# Patient Record
Sex: FEMALE | Race: WHITE | NOT HISPANIC OR LATINO | Employment: FULL TIME | URBAN - METROPOLITAN AREA
[De-identification: names, ages, dates, MRNs, and addresses within clinical notes are randomized per-mention and may not be internally consistent; named-entity substitution may affect disease eponyms.]

---

## 2017-10-27 ENCOUNTER — TRANSCRIBE ORDERS (OUTPATIENT)
Dept: ADMINISTRATIVE | Facility: HOSPITAL | Age: 52
End: 2017-10-27

## 2017-10-27 DIAGNOSIS — Z12.31 VISIT FOR SCREENING MAMMOGRAM: Primary | ICD-10-CM

## 2017-11-14 ENCOUNTER — HOSPITAL ENCOUNTER (OUTPATIENT)
Dept: RADIOLOGY | Age: 52
Discharge: HOME | End: 2017-11-14
Payer: COMMERCIAL

## 2017-11-14 DIAGNOSIS — Z12.31 VISIT FOR SCREENING MAMMOGRAM: ICD-10-CM

## 2017-11-14 PROCEDURE — G0202 SCR MAMMO BI INCL CAD: HCPCS

## 2017-11-16 ENCOUNTER — TRANSCRIBE ORDERS (OUTPATIENT)
Dept: ADMINISTRATIVE | Facility: HOSPITAL | Age: 52
End: 2017-11-16

## 2017-11-16 DIAGNOSIS — R92.8 ABNORMAL MAMMOGRAM: Primary | ICD-10-CM

## 2017-11-17 ENCOUNTER — HOSPITAL ENCOUNTER (OUTPATIENT)
Dept: MAMMOGRAPHY | Facility: CLINIC | Age: 52
Discharge: HOME | End: 2017-11-17
Payer: COMMERCIAL

## 2017-11-17 ENCOUNTER — HOSPITAL ENCOUNTER (OUTPATIENT)
Dept: ULTRASOUND IMAGING | Facility: CLINIC | Age: 52
Discharge: HOME | End: 2017-11-17
Payer: COMMERCIAL

## 2017-11-17 DIAGNOSIS — R92.8 ABNORMAL MAMMOGRAM: ICD-10-CM

## 2017-11-17 PROCEDURE — G0206 DX MAMMO INCL CAD UNI: HCPCS

## 2017-11-17 PROCEDURE — G0279 TOMOSYNTHESIS, MAMMO: HCPCS

## 2019-02-07 ENCOUNTER — OFFICE VISIT (OUTPATIENT)
Dept: OBGYN CLINIC | Facility: OTHER | Age: 54
End: 2019-02-07
Payer: COMMERCIAL

## 2019-02-07 ENCOUNTER — APPOINTMENT (OUTPATIENT)
Dept: RADIOLOGY | Facility: OTHER | Age: 54
End: 2019-02-07
Payer: COMMERCIAL

## 2019-02-07 VITALS
BODY MASS INDEX: 25.76 KG/M2 | WEIGHT: 140 LBS | SYSTOLIC BLOOD PRESSURE: 149 MMHG | HEIGHT: 62 IN | DIASTOLIC BLOOD PRESSURE: 90 MMHG | HEART RATE: 90 BPM

## 2019-02-07 DIAGNOSIS — M25.512 ACUTE PAIN OF LEFT SHOULDER: ICD-10-CM

## 2019-02-07 DIAGNOSIS — M75.02 ADHESIVE CAPSULITIS OF LEFT SHOULDER: Primary | ICD-10-CM

## 2019-02-07 PROCEDURE — 99203 OFFICE O/P NEW LOW 30 MIN: CPT | Performed by: ORTHOPAEDIC SURGERY

## 2019-02-07 PROCEDURE — 73030 X-RAY EXAM OF SHOULDER: CPT

## 2019-02-07 NOTE — PROGRESS NOTES
Assessment  Diagnoses and all orders for this visit:    Adhesive capsulitis of left shoulder        Discussion and Plan:  The patient has an examination consistent with adhesive capsulitis of the left shoulder  I have discussed with the patient the pathophysiology of this diagnosis and reviewed how the examination correlates with this diagnosis  Treatment options were discussed at length and after discussing these treatment options  Patient declined an injection today wishing to try physical therapy but we would be happy to provide her with an injection and any time in the future if necessary  Explained to the patient that it can take 6-9 mos of treatment to improve  If patient's ROM is still restricted at the 9 mos mary we would then consider capsular release  Subjective:   Patient ID: Rosa M Meng is a 52 y o  female      HPI   The patient presents with a chief complaint of left shoulder pain  The pain began 1 year(s) ago and is associated with an acute injury  Patient states she was walking her dog when he saw a rabbit and took off yanking her shoulder  The patient describes the pain as a constant dull ache in intensity with intermittent sharp pain, and localizes the pain to the  left lateral shoulder  The pain is worse with movement and relieved by rest   The pain is not associated with numbness and tingling  The pain is not associated with constitutional symptoms  The patient is not awoken at night by the pain  The patient has not had any treament  The following portions of the patient's history were reviewed and updated as appropriate: allergies, current medications, past family history, past medical history, past social history, past surgical history and problem list     Review of Systems   Constitutional: Negative for chills and fever  HENT: Negative for drooling and sneezing  Eyes: Negative for redness  Respiratory: Negative for cough and wheezing  Gastrointestinal: Negative for nausea and vomiting  Psychiatric/Behavioral: Negative for behavioral problems  The patient is not nervous/anxious  Objective:  Left Shoulder Exam     Tenderness   None    Range of Motion   Active Abduction:                       40  Forward Flexion:                        120  External Rotation:                      10  Internal Rotation 0 degrees:      Sacrum    Muscle Strength   Abduction:            4/5  External Rotation: 4/5    Comments:    No erythema, ecchymosis or effusion  NVI on exam today          Physical Exam   Constitutional: She is oriented to person, place, and time  She appears well-developed and well-nourished  Eyes: Pupils are equal, round, and reactive to light  Pulmonary/Chest: Effort normal and breath sounds normal    Neurological: She is alert and oriented to person, place, and time  Skin: Skin is warm and dry  Psychiatric: She has a normal mood and affect  Her behavior is normal  Judgment and thought content normal          I have personally reviewed pertinent films in PACS and my interpretation is as follows    Left shoulder x-rays:  3 views, no fracture or dislocation      Scribe Attestation    I,:   Agustina Kwan am acting as a scribe while in the presence of the attending physician :        I,:   Marcos Ponce MD personally performed the services described in this documentation    as scribed in my presence :

## 2019-02-25 ENCOUNTER — EVALUATION (OUTPATIENT)
Dept: PHYSICAL THERAPY | Facility: CLINIC | Age: 54
End: 2019-02-25
Payer: COMMERCIAL

## 2019-02-25 DIAGNOSIS — M75.02 ADHESIVE CAPSULITIS OF LEFT SHOULDER: Primary | ICD-10-CM

## 2019-02-25 PROCEDURE — 97162 PT EVAL MOD COMPLEX 30 MIN: CPT | Performed by: PHYSICAL THERAPIST

## 2019-02-25 PROCEDURE — 97110 THERAPEUTIC EXERCISES: CPT | Performed by: PHYSICAL THERAPIST

## 2019-02-25 PROCEDURE — 97140 MANUAL THERAPY 1/> REGIONS: CPT | Performed by: PHYSICAL THERAPIST

## 2019-02-25 NOTE — PROGRESS NOTES
PT Evaluation     Today's date: 2019  Patient name: Arun Vega  : 1965  MRN: [id number]  Referring provider: Torey Weiss MD  Dx:   Encounter Diagnosis     ICD-10-CM    1  Adhesive capsulitis of left shoulder M75 02 Ambulatory referral to Physical Therapy                  Assessment  Assessment details: Pt presents with signs and symptoms synonymous of admitting diagnosis  Pt presents with pain, decreased strength, decreased range, flexibility, as well as tolerance to activity and postural awareness  Pt would benefit skilled PT intervention in order to address these impairments in order to be able to perform all desired activities with minimal to nil symptom exacerbation  Thank you very much for this referral      Impairments: abnormal or restricted ROM, activity intolerance, impaired physical strength, lacks appropriate home exercise program, pain with function and poor posture   Understanding of Dx/Px/POC: good   Prognosis: good    Goals  STG 4 Weeks:  Decrease pain at worst to 5/10  Improve range to by 15 from IE  Improve strength to 4- within range  Independent with HEP  LTG 8 Weeks:  Decrease pain at worst to 2/10  Improve range to within 10 within contralateral shoulder  Improve strength to 4/5 within range     Able to perform all desired activities with minimal to nil symptom exacerbation      Plan  Patient would benefit from: skilled physical therapy  Planned modality interventions: cryotherapy and thermotherapy: hydrocollator packs  Planned therapy interventions: joint mobilization, manual therapy, neuromuscular re-education, patient education, postural training, strengthening, stretching, therapeutic activities, therapeutic exercise, home exercise program, graded motor, graded exercise, graded activity, gait training, functional ROM exercises and flexibility  Frequency: 1x week  Duration in weeks: 12  Treatment plan discussed with: patient        Subjective Evaluation    History of Present Illness  Date of onset: 2019  Mechanism of injury: Pt is a 52 yofemale who is RHD and presents today stating that her L shoulder began to bother her about a year ago while she was walking her dog, but the dog took off after a rabbit and it pulled her shoulder which feels as thought it was pulled out of the socket  Pt reports that her shoulder got worse and worse and finally 10 months later decided to meet with Dr Nicole Garcia who performed an X-ray which was (-) of abnormality and pt declined injection but was available for PT trial and thus presents today for Adhesive capsulitis  Pt reports that pain levels at worst can get up to 9/10 but it can have a base line of 4/10 irritability  Pt reports difficulty reaching, sleeping, and reaching behind her back  Pt reports that she has found improvement and follows up with Dr Nicole Garcia in May  Pt denies any neck pain, she states that sometimes the symptoms will go to her wrist but the majority is mostly in the front of the shoulder  Pt denies change in bowel of bladder     Pain  Current pain ratin  At best pain ratin  At worst pain ratin  Quality: dull ache  Relieving factors: heat  Aggravating factors: overhead activity  Progression: improved      Diagnostic Tests  X-ray: normal  Patient Goals  Patient goals for therapy: decreased pain, increased motion, increased strength and return to sport/leisure activities ([employer] classes)          Objective     Active Range of Motion   Left Shoulder   Flexion: 160 degrees   Abduction: 155 degrees   External rotation BTH: C7   Internal rotation BTB: T8     Right Shoulder   Flexion: 105 degrees with pain  Abduction: 90 degrees with pain  External rotation BTH: C4 with pain  Internal rotation BTB: sacrum with pain    Additional Active Range of Motion Details  Forward head, rounded shoulders  B/L Sensation intact to light touch C3,4,5,6,7,8,T1,T2   L 42# R 60#  CS Screen WFL and without pain  MMT   L Flex 3- Abd 3- ER 3- IR 3-   R Flex 5 Abd 5 ER 5 IR 5  PROM  L Flex 130 Abd 130 ER 20 IR 15  (AP and Inf Glide felt well improved range)   R  Full  Palpation: mild pain along LHBT  STs  L + HK, (-) RC tests, + ZOI  (-) Speeds     R all (-)             Precautions: Nil    Daily Treatment Diary       Manual 2/25            PROM + AP t/o 10            Inf Glide 2 min                                                   Exercise Diary             Scap Add 10" x10            Table slides all planes 6" x 10            Self Distraction ST 10" x 10            IR ST with Strap 10" x 10            Pulleys              Scaption             Wall Climb             UT + LS ST of L              AAROM Wand Flex Abd ER                                                                                                                                                                         Modalities             HP to L Shoulder prn

## 2019-03-04 ENCOUNTER — APPOINTMENT (OUTPATIENT)
Dept: PHYSICAL THERAPY | Facility: CLINIC | Age: 54
End: 2019-03-04
Payer: COMMERCIAL

## 2019-03-07 ENCOUNTER — OFFICE VISIT (OUTPATIENT)
Dept: PHYSICAL THERAPY | Facility: CLINIC | Age: 54
End: 2019-03-07
Payer: COMMERCIAL

## 2019-03-07 DIAGNOSIS — M75.02 ADHESIVE CAPSULITIS OF LEFT SHOULDER: Primary | ICD-10-CM

## 2019-03-07 PROCEDURE — 97110 THERAPEUTIC EXERCISES: CPT | Performed by: PHYSICAL THERAPIST

## 2019-03-07 PROCEDURE — 97140 MANUAL THERAPY 1/> REGIONS: CPT | Performed by: PHYSICAL THERAPIST

## 2019-03-07 NOTE — PROGRESS NOTES
Daily Note     Today's date: 3/7/2019  Patient name: Aleah Barry  : 1965  MRN: [id number]  Referring provider: Jose Miguel Nath MD  Dx:   Encounter Diagnosis     ICD-10-CM    1  Adhesive capsulitis of left shoulder M75 02                   Subjective: Pt presents today stating she notices improvement every week  States there has been some cracking without pain which pt is content with  Objective: See treatment diary below      Assessment: Proceeded with outlined activity without symptom exacerbation  PROM coming along very well after 1 5 weeks of HEP alone  Continue to progress as able       Precautions: Nil    Daily Treatment Diary       Manual 2/25 3/7           PROM + AP t/o 10 10           Inf Glide 2 min 2 in                                                  Exercise Diary             Scap Add 10" x10 10" x 10           Table slides all planes 6" x 10 6" x 10           Self Distraction ST 10" x 10 10" x 10           IR ST with Strap 10" x 10 10" x 10           Pulleys   3 min           Scaption  2 x 10           Wall Climb  6" x 10           UT + LS ST of L   nv           AAROM Wand Flex Abd ER  nv                                                                                                                                                                       Modalities             HP to L Shoulder prn

## 2019-03-11 ENCOUNTER — OFFICE VISIT (OUTPATIENT)
Dept: PHYSICAL THERAPY | Facility: CLINIC | Age: 54
End: 2019-03-11
Payer: COMMERCIAL

## 2019-03-11 DIAGNOSIS — M75.02 ADHESIVE CAPSULITIS OF LEFT SHOULDER: Primary | ICD-10-CM

## 2019-03-11 PROCEDURE — 97140 MANUAL THERAPY 1/> REGIONS: CPT | Performed by: PHYSICAL THERAPIST

## 2019-03-11 PROCEDURE — 97110 THERAPEUTIC EXERCISES: CPT | Performed by: PHYSICAL THERAPIST

## 2019-03-11 NOTE — PROGRESS NOTES
Daily Note     Today's date: 3/11/2019  Patient name: Yared Bolden  : 1965  MRN: [id number]  Referring provider: Matias Montes De Oca MD  Dx:   Encounter Diagnosis     ICD-10-CM    1  Adhesive capsulitis of left shoulder M75 02                   Subjective: Pt presents today stating night time is her largest difficulty at this time  Objective: See treatment diary below      Assessment: Proceeded with Neck ST to assist wth decreased UT tightness    Consider band work lightly n v      Precautions: Nil    Daily Treatment Diary       Manual 2/25 3/7 3/11          PROM + AP mob t/o 10 10 10 min          Inf Glide 2 min 2 min 2 min                                                 Exercise Diary             Scap Add 10" x10 10" x 10 10" x 10          Table slides all planes 6" x 10 6" x 10 6" x 10          Self Distraction ST 10" x 10 10" x 10 10" x 10          IR ST with Strap 10" x 10 10" x 10 10" x 10          Pulleys   3 min 3 min          Scaption  2 x 10 2 x 10          Wall Climb  6" x 10 6" x 10          UT + LS ST of L   nv 20" x 4          AAROM Wand Flex Abd ER  nv 20" x 4          Tband Row + Ext    YTB         Tband ER + IR    YTB                                                                                                                                           Modalities             HP to L Shoulder prn

## 2019-03-20 ENCOUNTER — OFFICE VISIT (OUTPATIENT)
Dept: PHYSICAL THERAPY | Facility: CLINIC | Age: 54
End: 2019-03-20
Payer: COMMERCIAL

## 2019-03-20 DIAGNOSIS — M75.02 ADHESIVE CAPSULITIS OF LEFT SHOULDER: Primary | ICD-10-CM

## 2019-03-20 PROCEDURE — 97110 THERAPEUTIC EXERCISES: CPT | Performed by: PHYSICAL THERAPIST

## 2019-03-20 PROCEDURE — 97140 MANUAL THERAPY 1/> REGIONS: CPT | Performed by: PHYSICAL THERAPIST

## 2019-03-20 NOTE — PROGRESS NOTES
Daily Note     Today's date: 3/20/2019  Patient name: Bud Garcia  : 1965  MRN: [id number]  Referring provider: Hima Gomez MD  Dx:   Encounter Diagnosis     ICD-10-CM    1  Adhesive capsulitis of left shoulder M75 02                   Subjective: Pt presents today stating she is continuing to find improvement with her range  She is very happy with decreasing pain levels and the popping within her shoulder continues to improve as well  Objective: See treatment diary below      Assessment: Proceeded with band work without symptom exacerbation  Updated HEP activities for pt progression        Precautions: Nil    Daily Treatment Diary       Manual 2/25 3/7 3/11 3/20         PROM + AP mob t/o 10 10 10 min 10 min         Inf Glide 2 min 2 min 2 min 2 min                                                Exercise Diary             Scap Add 10" x10 10" x 10 10" x 10 10" x 10         Table slides all planes 6" x 10 6" x 10 6" x 10 6" x 10         Self Distraction ST 10" x 10 10" x 10 10" x 10 10" x 10         IR ST with Strap 10" x 10 10" x 10 10" x 10 10" x 10         Pulleys   3 min 3 min 3 min         Scaption  2 x 10 2 x 10 2 x 10         Wall Climb  6" x 10 6" x 10 6" x 10         UT + LS ST of L   nv 20" x 4 20" x 4         AAROM Wand Flex Abd ER  nv 20" x 4 20" x 4         Tband Row + Ext    2 x 10 YTB         Tband ER postural    YTB 2 x 10                                                                                                                                           Modalities             HP to L Shoulder prn

## 2019-03-27 ENCOUNTER — OFFICE VISIT (OUTPATIENT)
Dept: PHYSICAL THERAPY | Facility: CLINIC | Age: 54
End: 2019-03-27
Payer: COMMERCIAL

## 2019-03-27 DIAGNOSIS — M75.02 ADHESIVE CAPSULITIS OF LEFT SHOULDER: Primary | ICD-10-CM

## 2019-03-27 PROCEDURE — 97140 MANUAL THERAPY 1/> REGIONS: CPT | Performed by: PHYSICAL THERAPIST

## 2019-03-27 PROCEDURE — 97110 THERAPEUTIC EXERCISES: CPT | Performed by: PHYSICAL THERAPIST

## 2019-03-27 NOTE — PROGRESS NOTES
Daily Note     Today's date: 3/27/2019  Patient name: Arun Vega  : 1965  MRN: [id number]  Referring provider: Torey Weiss MD  Dx:   Encounter Diagnosis     ICD-10-CM    1  Adhesive capsulitis of left shoulder M75 02                   Subjective: Pt presents today stating not as compliant with HEP, but as a whole pain levels are decreasing and she has been able to sleep better unless she rolls onto her L shoulder  Objective: See treatment diary below      Assessment: Enhanced band work today without symptom exacerbation    RE n v     Precautions: Nil    Daily Treatment Diary       Manual 2/25 3/7 3/11 3/20 3/27        PROM + AP mob t/o 10 10 10 min 10 min 10 min        Inf Glide 2 min 2 min 2 min 2 min 2 min                                               Exercise Diary             Scap Add 10" x10 10" x 10 10" x 10 10" x 10 10" x 10        Table slides all planes 6" x 10 6" x 10 6" x 10 6" x 10 6" x 10        Self Distraction ST 10" x 10 10" x 10 10" x 10 10" x 10 10" x 10        IR ST with Strap 10" x 10 10" x 10 10" x 10 10" x 10 10" x 10        Pulleys   3 min 3 min 3 min 3 min        Scaption  2 x 10 2 x 10 2 x 10 2 x 10        Wall Climb  6" x 10 6" x 10 6" x 10 6" x 10        UT + LS ST of L   nv 20" x 4 20" x 4 20" x 4        AAROM Wand Flex Abd ER  nv 20" x 4 20" x 4 20" x 4        Tband Row + Ext    2 x 10 YTB RTB 2 x 10        Tband ER postural    YTB 2 x 10 YTB 2 x 10                                                                                                                                           Modalities             HP to L Shoulder prn

## 2019-04-04 ENCOUNTER — EVALUATION (OUTPATIENT)
Dept: PHYSICAL THERAPY | Facility: CLINIC | Age: 54
End: 2019-04-04
Payer: COMMERCIAL

## 2019-04-04 DIAGNOSIS — M75.02 ADHESIVE CAPSULITIS OF LEFT SHOULDER: Primary | ICD-10-CM

## 2019-04-04 PROCEDURE — 97110 THERAPEUTIC EXERCISES: CPT | Performed by: PHYSICAL THERAPIST

## 2019-04-04 PROCEDURE — 97140 MANUAL THERAPY 1/> REGIONS: CPT | Performed by: PHYSICAL THERAPIST

## 2019-04-09 ENCOUNTER — OFFICE VISIT (OUTPATIENT)
Dept: PHYSICAL THERAPY | Facility: CLINIC | Age: 54
End: 2019-04-09
Payer: COMMERCIAL

## 2019-04-09 DIAGNOSIS — M75.02 ADHESIVE CAPSULITIS OF LEFT SHOULDER: Primary | ICD-10-CM

## 2019-04-09 PROCEDURE — 97140 MANUAL THERAPY 1/> REGIONS: CPT | Performed by: PHYSICAL THERAPIST

## 2019-04-09 PROCEDURE — 97110 THERAPEUTIC EXERCISES: CPT | Performed by: PHYSICAL THERAPIST

## 2019-04-10 ENCOUNTER — APPOINTMENT (OUTPATIENT)
Dept: PHYSICAL THERAPY | Facility: CLINIC | Age: 54
End: 2019-04-10
Payer: COMMERCIAL

## 2019-04-17 ENCOUNTER — OFFICE VISIT (OUTPATIENT)
Dept: PHYSICAL THERAPY | Facility: CLINIC | Age: 54
End: 2019-04-17
Payer: COMMERCIAL

## 2019-04-17 DIAGNOSIS — M75.02 ADHESIVE CAPSULITIS OF LEFT SHOULDER: Primary | ICD-10-CM

## 2019-04-17 PROCEDURE — 97110 THERAPEUTIC EXERCISES: CPT | Performed by: PHYSICAL THERAPIST

## 2019-04-17 PROCEDURE — 97140 MANUAL THERAPY 1/> REGIONS: CPT | Performed by: PHYSICAL THERAPIST

## 2019-10-11 ENCOUNTER — OFFICE VISIT (OUTPATIENT)
Dept: OBGYN CLINIC | Facility: CLINIC | Age: 54
End: 2019-10-11
Payer: COMMERCIAL

## 2019-10-11 VITALS
HEIGHT: 62 IN | SYSTOLIC BLOOD PRESSURE: 142 MMHG | BODY MASS INDEX: 26.72 KG/M2 | WEIGHT: 145.2 LBS | DIASTOLIC BLOOD PRESSURE: 82 MMHG

## 2019-10-11 DIAGNOSIS — Z01.419 ENCOUNTER FOR WELL WOMAN EXAM: Primary | ICD-10-CM

## 2019-10-11 DIAGNOSIS — Z78.0 POSTMENOPAUSAL: ICD-10-CM

## 2019-10-11 DIAGNOSIS — Z12.31 ENCOUNTER FOR SCREENING MAMMOGRAM FOR BREAST CANCER: ICD-10-CM

## 2019-10-11 DIAGNOSIS — Z11.51 SCREENING FOR HPV (HUMAN PAPILLOMAVIRUS): ICD-10-CM

## 2019-10-11 DIAGNOSIS — Z12.4 ROUTINE CERVICAL SMEAR: ICD-10-CM

## 2019-10-11 PROCEDURE — 99386 PREV VISIT NEW AGE 40-64: CPT | Performed by: PHYSICIAN ASSISTANT

## 2019-10-11 NOTE — PROGRESS NOTES
Assessment/Plan:    No problem-specific Assessment & Plan notes found for this encounter  Diagnoses and all orders for this visit:    Encounter for well woman exam    Postmenopausal    Encounter for screening mammogram for breast cancer  -     Mammo screening bilateral w 3d & cad; Future    Routine cervical smear  -     GP Pap Dependent HPV Cotest >= 27years old    Screening for HPV (human papillomavirus)  -     GP Pap Dependent HPV Cotest >= 27years old          Subjective:      Patient ID: Fco Harding is a 62 y o  female  Pt presents for her annual exam today--  She has no complaints  She has no bleeding or pelvic pain--postmeno  Bowel and bladder are regular  Colonoscopy--never--will check into it this year  No breast concerns today  Last mammo--2 years    pap today  rx mammo  Colonoscopy and new PCP discussed  Number for  [facility] P[facility] given      The following portions of the patient's history were reviewed and updated as appropriate: allergies, current medications, past family history, past medical history, past social history, past surgical history and problem list     Review of Systems   Constitutional: Negative for chills, fever and unexpected weight change  Gastrointestinal: Negative for abdominal pain, blood in stool, constipation and diarrhea  Genitourinary: Negative  Objective:      /82 (BP Location: Left arm, Patient Position: Sitting, Cuff Size: Standard)   Ht 5' 2" (1 575 m)   Wt 65 9 kg (145 lb 3 2 oz)   LMP 01/01/2017 (Within Months)   BMI 26 56 kg/m²          Physical Exam   Constitutional: She appears well-developed and well-nourished  HENT:   Head: Normocephalic and atraumatic  Neck: Normal range of motion  Pulmonary/Chest: Right breast exhibits no inverted nipple, no mass, no nipple discharge and no skin change  Left breast exhibits no inverted nipple, no mass, no nipple discharge and no skin change  Abdominal: Soft     Genitourinary: Vagina normal and uterus normal  Pelvic exam was performed with patient supine  There is no rash, tenderness or lesion on the right labia  There is no rash, tenderness or lesion on the left labia  Cervix exhibits no motion tenderness, no discharge and no friability  Right adnexum displays no mass, no tenderness and no fullness  Left adnexum displays no mass, no tenderness and no fullness  Lymphadenopathy: No inguinal adenopathy noted on the right or left side  Nursing note and vitals reviewed

## 2019-10-11 NOTE — PROGRESS NOTES
The patient is here for a yearly  The patient is due for a pap smear  The patient's insurance changed since her last pap smear so she is covered  No bleeding or cramping  No vaginal or urinary issues  No breast concerns  The patient went to [email or web address] who she saw two years ago

## 2019-10-20 LAB
HPV HR 12 DNA CVX QL NAA+PROBE: NOT DETECTED
HPV16 DNA SPEC QL NAA+PROBE: NOT DETECTED
HPV18 DNA SPEC QL NAA+PROBE: NOT DETECTED
THIN PREP CVX: NORMAL

## 2020-01-15 ENCOUNTER — HOSPITAL ENCOUNTER (OUTPATIENT)
Dept: RADIOLOGY | Age: 55
Discharge: HOME | End: 2020-01-15
Payer: COMMERCIAL

## 2020-01-15 VITALS — BODY MASS INDEX: 26.68 KG/M2 | WEIGHT: 145 LBS | HEIGHT: 62 IN

## 2020-01-15 DIAGNOSIS — Z12.31 ENCOUNTER FOR SCREENING MAMMOGRAM FOR BREAST CANCER: ICD-10-CM

## 2020-01-15 PROCEDURE — 77063 BREAST TOMOSYNTHESIS BI: CPT

## 2020-01-15 PROCEDURE — 77067 SCR MAMMO BI INCL CAD: CPT

## 2020-05-05 ENCOUNTER — OFFICE VISIT (OUTPATIENT)
Dept: INTERNAL MEDICINE CLINIC | Facility: CLINIC | Age: 55
End: 2020-05-05
Payer: COMMERCIAL

## 2020-05-05 VITALS
HEIGHT: 62 IN | SYSTOLIC BLOOD PRESSURE: 128 MMHG | TEMPERATURE: 97.8 F | WEIGHT: 155 LBS | BODY MASS INDEX: 28.52 KG/M2 | HEART RATE: 89 BPM | DIASTOLIC BLOOD PRESSURE: 80 MMHG | OXYGEN SATURATION: 98 %

## 2020-05-05 DIAGNOSIS — E78.49 OTHER HYPERLIPIDEMIA: ICD-10-CM

## 2020-05-05 DIAGNOSIS — Z13.220 SCREENING, LIPID: ICD-10-CM

## 2020-05-05 DIAGNOSIS — R13.19 OTHER DYSPHAGIA: ICD-10-CM

## 2020-05-05 DIAGNOSIS — Z00.00 HEALTH MAINTENANCE EXAMINATION: Primary | ICD-10-CM

## 2020-05-05 DIAGNOSIS — E66.3 OVER WEIGHT: ICD-10-CM

## 2020-05-05 DIAGNOSIS — Z12.11 SCREENING FOR COLON CANCER: ICD-10-CM

## 2020-05-05 DIAGNOSIS — Z00.00 LABORATORY EXAMINATION ORDERED AS PART OF A ROUTINE GENERAL MEDICAL EXAMINATION: ICD-10-CM

## 2020-05-05 PROCEDURE — 99386 PREV VISIT NEW AGE 40-64: CPT | Performed by: INTERNAL MEDICINE

## 2020-06-23 ENCOUNTER — TELEPHONE (OUTPATIENT)
Dept: INTERNAL MEDICINE CLINIC | Facility: CLINIC | Age: 55
End: 2020-06-23

## 2020-06-29 ENCOUNTER — TELEPHONE (OUTPATIENT)
Dept: GASTROENTEROLOGY | Facility: AMBULARY SURGERY CENTER | Age: 55
End: 2020-06-29

## 2020-07-01 ENCOUNTER — CONSULT (OUTPATIENT)
Dept: GASTROENTEROLOGY | Facility: AMBULARY SURGERY CENTER | Age: 55
End: 2020-07-01
Payer: COMMERCIAL

## 2020-07-01 VITALS — TEMPERATURE: 98.9 F | HEIGHT: 62 IN | BODY MASS INDEX: 27.23 KG/M2 | RESPIRATION RATE: 16 BRPM | WEIGHT: 148 LBS

## 2020-07-01 DIAGNOSIS — R13.19 OTHER DYSPHAGIA: Primary | ICD-10-CM

## 2020-07-01 PROCEDURE — 99244 OFF/OP CNSLTJ NEW/EST MOD 40: CPT | Performed by: INTERNAL MEDICINE

## 2020-07-01 PROCEDURE — 3008F BODY MASS INDEX DOCD: CPT | Performed by: INTERNAL MEDICINE

## 2020-07-01 NOTE — PROGRESS NOTES
Consultation -  [facility]  Debbie Ziegler 62 y o  female MRN: [id number]          Assessment & Plan:    Very pleasant 69-year-old female with a history of dysphagia for the past 2 to 3 months to solid foods  She has remote history of mild seasonal allergies, remote history of eczema and exercise induced asthma  1  Dysphagia:  Most likely secondary to underlying Schatzki's ring, underlying reflux, eosinophilic esophagitis and dysmotility are on the differential  -we will proceed with an upper endoscopy to evaluate for the above processes  -discussed with him the risks of the procedure including bleeding, surgery, perforation    2  Colon cancer screening:  Patient is due for screening colonoscopy, we will schedule this at a future date once her dysphagia has been worked up            _____________________________________________________________        CC:  Dysphagia    HPI:  Debbie Ziegler is a 62 y  o female who was referred for evaluation of dysphagia  As you know this is a pleasant 69-year-old female, very healthy with no significant medical history, surgical history is only notable for , she reports that over the past 3 months she has had solid food dysphagia which has intermittently worsened  In March she received Botox injection to her forehead, shortly thereafter she started developed dysphagia which has persisted  She denies any typical heartburn symptoms  Denies any nausea, vomiting, prior history of dysphagia  Reports regular bowel movements, denies any diarrhea, constipation, melena, rectal bleeding  Her weight has been stable  She has been working from home has had significant stressors at work but no significant change in her work, diet and sleep routines  No significant medications  No recent antibiotics  She reports dysphagia only to solids, she typically has to have some liquids to help push the food down    At this time she does develop some retrosternal pressure which resolved  She has not had any regurgitation episodes  Patient denies any tobacco   She drinks about 2 drinks per month  She works as a manager for an insurance company       Family history is negative for GI or associated malignancies  ROS:  The remainder of the ROS was negative except for the pertinent positives mentioned in HPI  Allergies: Patient has no known allergies  Medications: No current outpatient medications on file '    Past Medical History:   Diagnosis Date    Childhood asthma     Known health problems: none        Past Surgical History:   Procedure Laterality Date     SECTION      NO PAST SURGERIES         Family History   Problem Relation Age of Onset    Heart disease Mother 48    Asthma Mother     Heart disease Father 76        CABG    No Known Problems Sister     Colon cancer Paternal Grandmother 80    Skin cancer Maternal Aunt     Skin cancer Maternal Aunt     Skin cancer Maternal Aunt     No Known Problems Paternal Aunt     No Known Problems Paternal Aunt     Alcohol abuse Neg Hx     Substance Abuse Neg Hx     Mental illness Neg Hx     Depression Neg Hx         reports that she has never smoked  She has never used smokeless tobacco  She reports that she drinks alcohol  She reports that she does not use drugs            Physical Exam:     Temp 98 9 °F (37 2 °C) (Tympanic)   Resp 16   Ht 5' 2" (1 575 m)   Wt 67 1 kg (148 lb)   LMP 2017 (Within Months)   BMI 27 07 kg/m²     Gen: wn/wd, NAD, healthy-appearing female  HEENT: anicteric, MMM, no cervical LAD  CVS: RRR, no m/r/g  CHEST: CTA b/l  ABD: +BS, soft, NT,ND, no hepatosplenomegaly  EXT: no c/c/e  NEURO: aaox3  SKIN: NO rashes

## 2020-07-01 NOTE — LETTER
2020     Kaitlynn Husain MD  [address]  [address] [place] [address]    Patient: Ashley Miller   YOB: 1965   Date of Visit: 2020       Dear Dr Eusebio Sanford: Thank you for referring Jeff Perea to me for evaluation  Below are my notes for this consultation  If you have questions, please do not hesitate to call me  I look forward to following your patient along with you  Sincerely,        Lan Maciel MD        CC: No Recipients  Lan Maciel MD  2020  9:09 AM  Sign at close encounter  Consultation - [facility]  Ashley Miller 62 y o  female MRN: [id number]          Assessment & Plan:    Very pleasant 58-year-old female with a history of dysphagia for the past 2 to 3 months to solid foods  She has remote history of mild seasonal allergies, remote history of eczema and exercise induced asthma  1  Dysphagia:  Most likely secondary to underlying Schatzki's ring, underlying reflux, eosinophilic esophagitis and dysmotility are on the differential  -we will proceed with an upper endoscopy to evaluate for the above processes  -discussed with him the risks of the procedure including bleeding, surgery, perforation    2  Colon cancer screening:  Patient is due for screening colonoscopy, we will schedule this at a future date once her dysphagia has been worked up            _____________________________________________________________        CC:  Dysphagia    HPI:  Ashley Miller is a 62 y  o female who was referred for evaluation of dysphagia  As you know this is a pleasant 58-year-old female, very healthy with no significant medical history, surgical history is only notable for , she reports that over the past 3 months she has had solid food dysphagia which has intermittently worsened  In March she received Botox injection to her forehead, shortly thereafter she started developed dysphagia which has persisted    She denies any typical heartburn symptoms  Denies any nausea, vomiting, prior history of dysphagia  Reports regular bowel movements, denies any diarrhea, constipation, melena, rectal bleeding  Her weight has been stable  She has been working from home has had significant stressors at work but no significant change in her work, diet and sleep routines  No significant medications  No recent antibiotics  She reports dysphagia only to solids, she typically has to have some liquids to help push the food down  At this time she does develop some retrosternal pressure which resolved  She has not had any regurgitation episodes  Patient denies any tobacco   She drinks about 2 drinks per month  She works as a manager for an insurance company       Family history is negative for GI or associated malignancies  ROS:  The remainder of the ROS was negative except for the pertinent positives mentioned in HPI  Allergies: Patient has no known allergies  Medications: No current outpatient medications on file '    Past Medical History:   Diagnosis Date    Childhood asthma     Known health problems: none        Past Surgical History:   Procedure Laterality Date     SECTION      NO PAST SURGERIES         Family History   Problem Relation Age of Onset    Heart disease Mother 48    Asthma Mother     Heart disease Father 76        CABG    No Known Problems Sister     Colon cancer Paternal Grandmother 80    Skin cancer Maternal Aunt     Skin cancer Maternal Aunt     Skin cancer Maternal Aunt     No Known Problems Paternal Aunt     No Known Problems Paternal Aunt     Alcohol abuse Neg Hx     Substance Abuse Neg Hx     Mental illness Neg Hx     Depression Neg Hx         reports that she has never smoked  She has never used smokeless tobacco  She reports that she drinks alcohol  She reports that she does not use drugs            Physical Exam:     Temp 98 9 °F (37 2 °C) (Tympanic)   Resp 16   Ht 5' 2" (1 575 m)   Wt 67 1 kg (148 lb)   LMP 01/01/2017 (Within Months)   BMI 27 07 kg/m²      Gen: wn/wd, NAD, healthy-appearing female  HEENT: anicteric, MMM, no cervical LAD  CVS: RRR, no m/r/g  CHEST: CTA b/l  ABD: +BS, soft, NT,ND, no hepatosplenomegaly  EXT: no c/c/e  NEURO: aaox3  SKIN: NO rashes

## 2020-07-08 NOTE — PRE-PROCEDURE INSTRUCTIONS
Pre-Surgery Instructions:   Medication Instructions    Naproxen Sodium (ALEVE PO) Patient was instructed by Physician and understands  Pt to follow Dr Ac Witt instructions  Pt to have Covid-19 test done on 7/9 or 7/10/2020     Chantell Gonzalez [phone]

## 2020-07-10 ENCOUNTER — TELEPHONE (OUTPATIENT)
Dept: GASTROENTEROLOGY | Facility: AMBULARY SURGERY CENTER | Age: 55
End: 2020-07-10

## 2020-07-10 DIAGNOSIS — Z11.59 SCREENING FOR VIRAL DISEASE: ICD-10-CM

## 2020-07-10 PROCEDURE — U0003 INFECTIOUS AGENT DETECTION BY NUCLEIC ACID (DNA OR RNA); SEVERE ACUTE RESPIRATORY SYNDROME CORONAVIRUS 2 (SARS-COV-2) (CORONAVIRUS DISEASE [COVID-19]), AMPLIFIED PROBE TECHNIQUE, MAKING USE OF HIGH THROUGHPUT TECHNOLOGIES AS DESCRIBED BY CMS-2020-01-R: HCPCS

## 2020-07-10 NOTE — TELEPHONE ENCOUNTER
Called [facility] and spoke to [facility] for prior authorization requirements for patient's EGD  No auth required    Call ref #[id number] @11:46AM

## 2020-07-11 LAB
INPATIENT: NORMAL
SARS-COV-2 RNA SPEC QL NAA+PROBE: NOT DETECTED

## 2020-07-15 ENCOUNTER — ANESTHESIA EVENT (OUTPATIENT)
Dept: GASTROENTEROLOGY | Facility: AMBULARY SURGERY CENTER | Age: 55
End: 2020-07-15

## 2020-07-15 NOTE — ANESTHESIA PREPROCEDURE EVALUATION
Review of Systems/Medical History  Patient summary reviewed  Chart reviewed  No history of anesthetic complications     Cardiovascular  Exercise tolerance (METS): >4,  Hyperlipidemia,    Pulmonary  Asthma ,        GI/Hepatic            Endo/Other     GYN       Hematology   Musculoskeletal       Neurology   Psychology           Physical Exam    Airway    Mallampati score: II  TM Distance: >3 FB  Neck ROM: full     Dental       Cardiovascular  Cardiovascular exam normal    Pulmonary  Pulmonary exam normal     Other Findings        Anesthesia Plan  ASA Score- 2     Anesthesia Type- IV sedation with anesthesia with ASA Monitors  Additional Monitors:   Airway Plan:         Plan Factors-    Induction-     Postoperative Plan-     Informed Consent- Anesthetic plan and risks discussed with patient  I personally reviewed this patient with the CRNA  Discussed and agreed on the Anesthesia Plan with the CRNA  Renita Reardon

## 2020-07-16 ENCOUNTER — ANESTHESIA (OUTPATIENT)
Dept: GASTROENTEROLOGY | Facility: AMBULARY SURGERY CENTER | Age: 55
End: 2020-07-16

## 2020-07-16 ENCOUNTER — HOSPITAL ENCOUNTER (OUTPATIENT)
Dept: GASTROENTEROLOGY | Facility: AMBULARY SURGERY CENTER | Age: 55
Setting detail: OUTPATIENT SURGERY
Discharge: HOME | End: 2020-07-16
Attending: INTERNAL MEDICINE | Admitting: INTERNAL MEDICINE
Payer: COMMERCIAL

## 2020-07-16 VITALS
HEART RATE: 66 BPM | OXYGEN SATURATION: 98 % | BODY MASS INDEX: 27.23 KG/M2 | WEIGHT: 148 LBS | DIASTOLIC BLOOD PRESSURE: 80 MMHG | TEMPERATURE: 95.5 F | HEIGHT: 62 IN | SYSTOLIC BLOOD PRESSURE: 128 MMHG | RESPIRATION RATE: 14 BRPM

## 2020-07-16 DIAGNOSIS — R13.19 OTHER DYSPHAGIA: ICD-10-CM

## 2020-07-16 PROBLEM — K22.2 SCHATZKI'S RING: Status: ACTIVE | Noted: 2020-07-16

## 2020-07-16 PROCEDURE — 43239 EGD BIOPSY SINGLE/MULTIPLE: CPT | Performed by: INTERNAL MEDICINE

## 2020-07-16 PROCEDURE — 88305 TISSUE EXAM BY PATHOLOGIST: CPT | Performed by: PATHOLOGY

## 2020-07-16 PROCEDURE — 43450 DILATE ESOPHAGUS 1/MULT PASS: CPT | Performed by: INTERNAL MEDICINE

## 2020-07-16 RX ORDER — LIDOCAINE HYDROCHLORIDE 10 MG/ML
INJECTION, SOLUTION EPIDURAL; INFILTRATION; INTRACAUDAL; PERINEURAL AS NEEDED
Status: DISCONTINUED | OUTPATIENT
Start: 2020-07-16 | End: 2020-07-16 | Stop reason: SURG

## 2020-07-16 RX ORDER — SODIUM CHLORIDE, SODIUM LACTATE, POTASSIUM CHLORIDE, CALCIUM CHLORIDE 600; 310; 30; 20 MG/100ML; MG/100ML; MG/100ML; MG/100ML
INJECTION, SOLUTION INTRAVENOUS CONTINUOUS PRN
Status: DISCONTINUED | OUTPATIENT
Start: 2020-07-16 | End: 2020-07-16 | Stop reason: SURG

## 2020-07-16 RX ORDER — PROPOFOL 10 MG/ML
INJECTION, EMULSION INTRAVENOUS AS NEEDED
Status: DISCONTINUED | OUTPATIENT
Start: 2020-07-16 | End: 2020-07-16 | Stop reason: SURG

## 2020-07-16 RX ORDER — SODIUM CHLORIDE, SODIUM LACTATE, POTASSIUM CHLORIDE, CALCIUM CHLORIDE 600; 310; 30; 20 MG/100ML; MG/100ML; MG/100ML; MG/100ML
125 INJECTION, SOLUTION INTRAVENOUS CONTINUOUS
Status: DISCONTINUED | OUTPATIENT
Start: 2020-07-16 | End: 2020-07-20 | Stop reason: HOSPADM

## 2020-07-16 RX ORDER — PANTOPRAZOLE SODIUM 40 MG/1
40 TABLET, DELAYED RELEASE ORAL DAILY
Qty: 30 TABLET | Refills: 3 | Status: SHIPPED | OUTPATIENT
Start: 2020-07-16

## 2020-07-16 RX ADMIN — PROPOFOL 60 MG: 10 INJECTION, EMULSION INTRAVENOUS at 08:06

## 2020-07-16 RX ADMIN — PROPOFOL 50 MG: 10 INJECTION, EMULSION INTRAVENOUS at 08:09

## 2020-07-16 RX ADMIN — PROPOFOL 120 MG: 10 INJECTION, EMULSION INTRAVENOUS at 08:04

## 2020-07-16 RX ADMIN — LIDOCAINE HYDROCHLORIDE 50 MG: 10 INJECTION, SOLUTION EPIDURAL; INFILTRATION; INTRACAUDAL; PERINEURAL at 08:04

## 2020-07-16 RX ADMIN — SODIUM CHLORIDE, SODIUM LACTATE, POTASSIUM CHLORIDE, AND CALCIUM CHLORIDE: .6; .31; .03; .02 INJECTION, SOLUTION INTRAVENOUS at 08:01

## 2020-07-16 RX ADMIN — SODIUM CHLORIDE, SODIUM LACTATE, POTASSIUM CHLORIDE, AND CALCIUM CHLORIDE 125 ML/HR: .6; .31; .03; .02 INJECTION, SOLUTION INTRAVENOUS at 07:59

## 2020-07-16 NOTE — H&P
History and Physical -  [facility]  Diane Fraire 62 y o  female MRN: [id number]    HPI: Diane Fraire is a 62y o  year old female who presents with solid food dysphagia       Review of Systems    Historical Information   Past Medical History:   Diagnosis Date    Asthma     exercise induced    Childhood asthma     Contact lens/glasses fitting     Dental root implant present     upper right and left    Dysphagia     food gets stuck-liquids are ok    Hyperlipidemia      Past Surgical History:   Procedure Laterality Date     SECTION      DILATION AND CURETTAGE OF UTERUS  2010    TOOTH EXTRACTION      ,2019     Social History   Social History     Substance and Sexual Activity   Alcohol Use Yes    Frequency: 2-4 times a month    Drinks per session: 1 or 2    Comment: socially     Social History     Substance and Sexual Activity   Drug Use Never     Social History     Tobacco Use   Smoking Status Never Smoker   Smokeless Tobacco Never Used     Family History   Problem Relation Age of Onset    Heart disease Mother 54        CABG    Asthma Mother     Hyperlipidemia Mother     Heart disease Father 76        CABG    Dementia Father     No Known Problems Sister     Colon cancer Paternal Grandmother 80    Skin cancer Maternal Aunt     Skin cancer Maternal Aunt     Skin cancer Maternal Aunt     No Known Problems Paternal Aunt     No Known Problems Paternal Aunt     Alcohol abuse Neg Hx     Substance Abuse Neg Hx     Mental illness Neg Hx     Depression Neg Hx        Meds/Allergies       (Not in a hospital admission)    No Known Allergies    Objective     /78   Pulse 77   Temp (!) 96 9 °F (36 1 °C) (Temporal)   Resp 18   Ht 5' 2" (1 575 m)   Wt 67 1 kg (148 lb)   LMP 2017 (Within Months)   SpO2 97%   BMI 27 07 kg/m²       PHYSICAL EXAM    Gen: NAD  CV: RRR  CHEST: Clear  ABD: soft, NT/ND  EXT: no edema  Neuro: AAO      ASSESSMENT/PLAN:  This is a 62y o  year old female here for solid food dysphagia         PLAN:   Procedure: Shahab Julien

## 2020-07-16 NOTE — ANESTHESIA POSTPROCEDURE EVALUATION
Post-Op Assessment Note    CV Status:  Stable  Pain Score: 0    Pain management: adequate     Mental Status:  Sleepy   Hydration Status:  Euvolemic   PONV Controlled:  Controlled   Airway Patency:  Patent   Post Op Vitals Reviewed: Yes      Staff: CRNA           /61 (07/16/20 0815)    Temp     Pulse 75 (07/16/20 0815)   Resp 16 (07/16/20 0815)    SpO2 95 % (07/16/20 0815)

## 2020-07-22 ENCOUNTER — TELEPHONE (OUTPATIENT)
Dept: GASTROENTEROLOGY | Facility: MEDICAL CENTER | Age: 55
End: 2020-07-22

## 2020-07-22 NOTE — TELEPHONE ENCOUNTER
Left message for pt regarding results, aware to call if her swallowing is not better, also aware to schedule colonoscopy

## 2020-07-22 NOTE — TELEPHONE ENCOUNTER
----- Message from Ming Ding MD sent at 7/21/2020 12:57 PM EDT -----  Please inform the patient biopsies from her stomach were negative for H pylori  Please find out if her swallowing has improved after dilation  As we discussed I would recommend that she schedule a colonoscopy for screening purposes at her convenience in the next several months  If she would like we can see her in the office to discuss further, please have her call with any questions or concerns, otherwise schedule colonoscopy at her convenience

## 2020-10-23 ENCOUNTER — ANNUAL EXAM (OUTPATIENT)
Dept: OBGYN CLINIC | Facility: CLINIC | Age: 55
End: 2020-10-23
Payer: COMMERCIAL

## 2020-10-23 VITALS
WEIGHT: 150 LBS | BODY MASS INDEX: 27.6 KG/M2 | DIASTOLIC BLOOD PRESSURE: 80 MMHG | SYSTOLIC BLOOD PRESSURE: 132 MMHG | HEIGHT: 62 IN

## 2020-10-23 DIAGNOSIS — Z12.39 ENCOUNTER FOR SCREENING BREAST EXAMINATION: ICD-10-CM

## 2020-10-23 DIAGNOSIS — Z12.31 ENCOUNTER FOR SCREENING MAMMOGRAM FOR BREAST CANCER: ICD-10-CM

## 2020-10-23 DIAGNOSIS — Z78.0 POSTMENOPAUSAL: ICD-10-CM

## 2020-10-23 DIAGNOSIS — Z01.419 ENCOUNTER FOR WELL WOMAN EXAM: Primary | ICD-10-CM

## 2020-10-23 PROCEDURE — 99396 PREV VISIT EST AGE 40-64: CPT | Performed by: PHYSICIAN ASSISTANT

## 2021-04-14 ENCOUNTER — HOSPITAL ENCOUNTER (OUTPATIENT)
Dept: RADIOLOGY | Age: 56
Discharge: HOME | End: 2021-04-14
Payer: COMMERCIAL

## 2021-04-14 VITALS — WEIGHT: 150 LBS | HEIGHT: 62 IN | BODY MASS INDEX: 27.6 KG/M2

## 2021-04-14 DIAGNOSIS — Z12.31 ENCOUNTER FOR SCREENING MAMMOGRAM FOR BREAST CANCER: ICD-10-CM

## 2021-04-14 PROCEDURE — 77063 BREAST TOMOSYNTHESIS BI: CPT

## 2021-04-14 PROCEDURE — 77067 SCR MAMMO BI INCL CAD: CPT

## 2021-05-05 ENCOUNTER — RA CDI HCC (OUTPATIENT)
Dept: OTHER | Facility: HOSPITAL | Age: 56
End: 2021-05-05

## 2021-05-05 NOTE — PROGRESS NOTES
[facility] 75  coding opportunities          Chart reviewed, no opportunity found: CHART REVIEWED, NO OPPORTUNITY FOUND              Patients insurance company: [employer] (Medicare and Commercial for [employer] and [employer])

## 2021-06-28 ENCOUNTER — VBI (OUTPATIENT)
Dept: ADMINISTRATIVE | Facility: OTHER | Age: 56
End: 2021-06-28

## 2021-12-16 NOTE — TELEPHONE ENCOUNTER
12/16/21 10:31 AM     See documentation in the VB [employer] SmartForm       Silvia Carter, [address] [facility] Leslee Owen

## 2022-02-02 NOTE — TELEPHONE ENCOUNTER
02/02/22 10:16 AM     See documentation in the VB [employer] SmartForm       Esequiel Echols, [address] [facility] Leslee Owen

## 2022-03-25 ENCOUNTER — ANNUAL EXAM (OUTPATIENT)
Dept: OBGYN CLINIC | Facility: CLINIC | Age: 57
End: 2022-03-25
Payer: COMMERCIAL

## 2022-03-25 VITALS
BODY MASS INDEX: 26.79 KG/M2 | HEIGHT: 62 IN | WEIGHT: 145.6 LBS | DIASTOLIC BLOOD PRESSURE: 76 MMHG | SYSTOLIC BLOOD PRESSURE: 130 MMHG

## 2022-03-25 DIAGNOSIS — Z12.39 ENCOUNTER FOR SCREENING BREAST EXAMINATION: ICD-10-CM

## 2022-03-25 DIAGNOSIS — Z78.0 POSTMENOPAUSAL: ICD-10-CM

## 2022-03-25 DIAGNOSIS — Z12.31 ENCOUNTER FOR SCREENING MAMMOGRAM FOR BREAST CANCER: ICD-10-CM

## 2022-03-25 DIAGNOSIS — Z11.51 SCREENING FOR HPV (HUMAN PAPILLOMAVIRUS): ICD-10-CM

## 2022-03-25 DIAGNOSIS — Z12.4 CERVICAL CANCER SCREENING: ICD-10-CM

## 2022-03-25 DIAGNOSIS — Z01.419 ROUTINE GYNECOLOGICAL EXAMINATION: ICD-10-CM

## 2022-03-25 DIAGNOSIS — Z01.419 ENCOUNTER FOR WELL WOMAN EXAM: Primary | ICD-10-CM

## 2022-03-25 PROCEDURE — G0145 SCR C/V CYTO,THINLAYER,RESCR: HCPCS | Performed by: PHYSICIAN ASSISTANT

## 2022-03-25 PROCEDURE — G0476 HPV COMBO ASSAY CA SCREEN: HCPCS | Performed by: PHYSICIAN ASSISTANT

## 2022-03-25 PROCEDURE — 99396 PREV VISIT EST AGE 40-64: CPT | Performed by: PHYSICIAN ASSISTANT

## 2022-03-25 NOTE — PROGRESS NOTES
The patient is here for a yearly  The patient is due for a pap smear  Pap normal HPV neg 10/11/19  No bleeding or cramping  No vaginal, bowel, bladder or breast problems

## 2022-03-28 NOTE — PROGRESS NOTES
Assessment/Plan:    No problem-specific Assessment & Plan notes found for this encounter  Diagnoses and all orders for this visit:    Encounter for well woman exam    Routine gynecological examination  -     Liquid-based pap, screening    Encounter for screening breast examination    Postmenopausal    Screening for HPV (human papillomavirus)  -     Liquid-based pap, screening    Encounter for screening mammogram for breast cancer  -     Mammo screening bilateral w 3d & cad; Future    Cervical cancer screening    Other orders  -     COLLAGEN PO; Take by mouth          Subjective:      Patient ID: Onesimo Person is a 60 y o  female  Pt presents for her annual exam today--  She has no complaints  She has no bleeding or pelvic pain  Bowel and bladder are regular  Colonoscopy--utd, sees GI regularly  No breast concerns today  Last mammo--4/21      pap today  rx mammo  Daily ca, d      The following portions of the patient's history were reviewed and updated as appropriate: allergies, current medications, past family history, past medical history, past social history, past surgical history and problem list     Review of Systems   Constitutional: Negative for chills, fever and unexpected weight change  Gastrointestinal: Negative for abdominal pain, blood in stool, constipation and diarrhea  Genitourinary: Negative  Objective:      /76   Ht 5' 2" (1 575 m)   Wt 66 kg (145 lb 9 6 oz)   LMP 01/01/2017 (Within Months)   BMI 26 63 kg/m²          Physical Exam  Vitals and nursing note reviewed  Constitutional:       Appearance: She is well-developed  HENT:      Head: Normocephalic and atraumatic  Chest:   Breasts:      Right: No inverted nipple, mass, nipple discharge or skin change  Left: No inverted nipple, mass, nipple discharge or skin change  Abdominal:      Palpations: Abdomen is soft  Genitourinary:     Exam position: Supine        Labia:         Right: No rash, tenderness or lesion  Left: No rash, tenderness or lesion  Vagina: Normal       Cervix: No cervical motion tenderness, discharge or friability  Adnexa:         Right: No mass, tenderness or fullness  Left: No mass, tenderness or fullness  Musculoskeletal:      Cervical back: Normal range of motion  Lymphadenopathy:      Lower Body: No right inguinal adenopathy  No left inguinal adenopathy

## 2022-03-31 LAB
HPV HR 12 DNA CVX QL NAA+PROBE: NEGATIVE
HPV16 DNA CVX QL NAA+PROBE: NEGATIVE
HPV18 DNA CVX QL NAA+PROBE: NEGATIVE

## 2022-04-01 LAB
LAB AP GYN PRIMARY INTERPRETATION: NORMAL
Lab: NORMAL

## 2022-06-15 ENCOUNTER — VBI (OUTPATIENT)
Dept: ADMINISTRATIVE | Facility: OTHER | Age: 57
End: 2022-06-15

## 2022-06-15 NOTE — TELEPHONE ENCOUNTER
06/15/22 3:43 PM     See documentation in the VB [employer] SmartForm       Brittney Baumann, 117 [facility] Leslee Owen

## 2023-03-24 ENCOUNTER — HOSPITAL ENCOUNTER (OUTPATIENT)
Dept: MAMMOGRAPHY | Facility: HOSPITAL | Age: 58
Discharge: HOME | End: 2023-03-24

## 2023-03-24 VITALS — WEIGHT: 145 LBS | BODY MASS INDEX: 26.68 KG/M2 | HEIGHT: 62 IN

## 2023-03-24 DIAGNOSIS — Z12.31 ENCOUNTER FOR SCREENING MAMMOGRAM FOR BREAST CANCER: ICD-10-CM

## 2023-09-19 ENCOUNTER — OFFICE VISIT (OUTPATIENT)
Dept: INTERNAL MEDICINE CLINIC | Facility: CLINIC | Age: 58
End: 2023-09-19
Payer: COMMERCIAL

## 2023-09-19 VITALS
HEIGHT: 62 IN | DIASTOLIC BLOOD PRESSURE: 80 MMHG | SYSTOLIC BLOOD PRESSURE: 124 MMHG | TEMPERATURE: 97.2 F | HEART RATE: 98 BPM | OXYGEN SATURATION: 99 % | BODY MASS INDEX: 27.97 KG/M2 | WEIGHT: 152 LBS

## 2023-09-19 DIAGNOSIS — E66.3 OVERWEIGHT (BMI 25.0-29.9): ICD-10-CM

## 2023-09-19 DIAGNOSIS — E78.49 OTHER HYPERLIPIDEMIA: ICD-10-CM

## 2023-09-19 DIAGNOSIS — Z00.00 LABORATORY EXAMINATION ORDERED AS PART OF A ROUTINE GENERAL MEDICAL EXAMINATION: ICD-10-CM

## 2023-09-19 DIAGNOSIS — K22.2 SCHATZKI'S RING: ICD-10-CM

## 2023-09-19 DIAGNOSIS — Z00.00 HEALTH MAINTENANCE EXAMINATION: Primary | ICD-10-CM

## 2023-09-19 PROBLEM — M75.02 ADHESIVE CAPSULITIS OF LEFT SHOULDER: Status: RESOLVED | Noted: 2019-02-07 | Resolved: 2023-09-19

## 2023-09-19 PROBLEM — R13.19 OTHER DYSPHAGIA: Status: RESOLVED | Noted: 2020-05-05 | Resolved: 2023-09-19

## 2023-09-19 PROCEDURE — 99396 PREV VISIT EST AGE 40-64: CPT | Performed by: INTERNAL MEDICINE

## 2023-09-19 NOTE — PROGRESS NOTES
[facility]    NAME: Fredrick Hendricks  AGE: 59 y.o. SEX: female  : 1965     DATE: 2023     Assessment and Plan:     Problem List Items Addressed This Visit        Digestive    Schatzki's ring     No recent issues. Other    Other hyperlipidemia     Lipids due. Relevant Orders    Comprehensive metabolic panel    Lipid panel    TSH, 3rd generation with Free T4 reflex    Overweight (BMI 25.0-29. 9)     Weight stable. Recommend lifting light weights. Other Visit Diagnoses     Health maintenance examination    -  Primary    Dental exam, colonoscopy due. Recommend Shingrix. Received initial COVID vaccine (2 doses). Laboratory examination ordered as part of a routine general medical examination        Relevant Orders    CBC and differential    Comprehensive metabolic panel    Lipid panel    TSH, 3rd generation with Free T4 reflex          Immunizations and preventive care screenings were discussed with patient today. Appropriate education was printed on patient's after visit summary. Counseling:  Dental Health: discussed importance of regular tooth brushing, flossing, and dental visits. Injury prevention: discussed safety/seat belts, safety helmets, smoke detectors, carbon dioxide detectors, and smoking near bedding or upholstery. Exercise: the importance of regular exercise/physical activity was discussed. Recommend exercise 3-5 times per week for at least 30 minutes. BMI Counseling: Body mass index is 27.8 kg/m². The BMI is above normal. Nutrition recommendations include encouraging healthy choices of fruits and vegetables. Exercise recommendations include moderate physical activity 150 minutes/week and strength training exercises. Rationale for BMI follow-up plan is due to patient being overweight or obese.      Depression Screening and Follow-up Plan: Patient was screened for depression during today's encounter. They screened negative with a PHQ-2 score of 0. Return in about 2 years (around 9/19/2025). Chief Complaint:     Chief Complaint   Patient presents with   • Physical Exam      History of Present Illness:     Adult Annual Physical   Patient here for a comprehensive physical exam. The patient reports no problems. She feels well, no complaints. No issues with swallowing. She exercises regularly, does pilates. She has occasional low back pain, no recent injury. No issues with sleep. Diet and Physical Activity  Diet/Nutrition: well balanced diet. Exercise: walking. Depression Screening  PHQ-2/9 Depression Screening    Little interest or pleasure in doing things: 0 - not at all  Feeling down, depressed, or hopeless: 0 - not at all  PHQ-2 Score: 0  PHQ-2 Interpretation: Negative depression screen       General Health  Sleep: sleeps well. Hearing: normal - bilateral.  Vision: no vision problems, most recent eye exam <1 year ago and wears contacts. Dental: no dental visits for >1 year. /GYN Health  Patient is: postmenopausal  Last menstrual period: . Contraceptive method: . Daxa Rodriguez Review of Systems:     Review of Systems   Constitutional: Negative for appetite change and fatigue. HENT: Negative for congestion, ear pain and postnasal drip. Eyes: Negative for visual disturbance. Respiratory: Negative for cough and shortness of breath. Cardiovascular: Negative for chest pain and leg swelling. Gastrointestinal: Negative for abdominal pain, constipation and diarrhea. Genitourinary: Negative for dysuria, frequency and urgency. Musculoskeletal: Negative for arthralgias and myalgias. Skin: Negative for rash and wound. Neurological: Negative for dizziness, numbness and headaches. Hematological: Does not bruise/bleed easily. Psychiatric/Behavioral: Negative for confusion and sleep disturbance. The patient is not nervous/anxious.        Past Medical History:     Past Medical History:   Diagnosis Date   • Asthma     exercise induced   • Childhood asthma    • Contact lens/glasses fitting    • Dental root implant present     upper right and left   • Dysphagia     food gets stuck-liquids are ok   • Hyperlipidemia       Past Surgical History:     Past Surgical History:   Procedure Laterality Date   •  SECTION     • DILATION AND CURETTAGE OF UTERUS     • TOOTH EXTRACTION      ,   • UPPER GASTROINTESTINAL ENDOSCOPY  2020      Social History:     Social History     Socioeconomic History   • Marital status: /Civil Union     Spouse name: None   • Number of children: None   • Years of education: None   • Highest education level: None   Occupational History   • None   Tobacco Use   • Smoking status: Never   • Smokeless tobacco: Never   Vaping Use   • Vaping Use: Never used   Substance and Sexual Activity   • Alcohol use: Yes     Comment: socially   • Drug use: Never   • Sexual activity: Yes     Birth control/protection: Post-menopausal   Other Topics Concern   • None   Social History Narrative        Working - insurance    2 dogs     Social Determinants of Health     Financial Resource Strain: Not on file   Food Insecurity: Not on file   Transportation Needs: Not on file   Physical Activity: Not on file   Stress: Not on file   Social Connections: Not on file   Intimate Partner Violence: Not on file   Housing Stability: Not on file      Family History:     Family History   Problem Relation Age of Onset   • Heart disease Mother 54        CABG   • Asthma Mother    • Hyperlipidemia Mother    • Heart disease Father 76        CABG   • Dementia Father    • No Known Problems Sister    • Colon cancer Paternal Grandmother 80   • Skin cancer Maternal Aunt    • Skin cancer Maternal Aunt    • Skin cancer Maternal Aunt    • Stroke Paternal Aunt    • No Known Problems Paternal Aunt    • Skin cancer Maternal Grandmother    • No Known Problems Maternal Grandfather    • No Known Problems Paternal Grandfather    • No Known Problems Son    • No Known Problems Son    • Alcohol abuse Neg Hx    • Substance Abuse Neg Hx    • Mental illness Neg Hx    • Depression Neg Hx       Current Medications:     Current Outpatient Medications   Medication Sig Dispense Refill   • COLLAGEN PO Take by mouth       No current facility-administered medications for this visit. Allergies:     No Known Allergies   Physical Exam:     /80   Pulse 98   Temp (!) 97.2 °F (36.2 °C)   Ht 5' 2" (1.575 m)   Wt 68.9 kg (152 lb)   LMP 01/01/2017 (Within Months)   SpO2 99%   BMI 27.80 kg/m²     Physical Exam  Vitals and nursing note reviewed. Constitutional:       General: She is not in acute distress. Appearance: She is well-developed. HENT:      Head: Normocephalic and atraumatic. Right Ear: Tympanic membrane, ear canal and external ear normal.      Left Ear: Tympanic membrane, ear canal and external ear normal.      Mouth/Throat:      Mouth: Mucous membranes are moist.   Eyes:      Conjunctiva/sclera: Conjunctivae normal.   Cardiovascular:      Rate and Rhythm: Normal rate and regular rhythm. Heart sounds: No murmur heard. Pulmonary:      Effort: Pulmonary effort is normal. No respiratory distress. Breath sounds: Normal breath sounds. Abdominal:      Palpations: Abdomen is soft. Tenderness: There is no abdominal tenderness. Musculoskeletal:         General: No swelling. Cervical back: Neck supple. Skin:     General: Skin is warm and dry. Neurological:      General: No focal deficit present. Mental Status: She is alert and oriented to person, place, and time.    Psychiatric:         Mood and Affect: Mood normal.          MD Zakia Fernandez

## 2024-04-12 ENCOUNTER — ANNUAL EXAM (OUTPATIENT)
Dept: GYNECOLOGY | Facility: CLINIC | Age: 59
End: 2024-04-12
Payer: COMMERCIAL

## 2024-04-12 VITALS
WEIGHT: 151.4 LBS | BODY MASS INDEX: 27.86 KG/M2 | HEIGHT: 62 IN | SYSTOLIC BLOOD PRESSURE: 160 MMHG | DIASTOLIC BLOOD PRESSURE: 84 MMHG

## 2024-04-12 DIAGNOSIS — Z01.419 ENCOUNTER FOR WELL WOMAN EXAM: Primary | ICD-10-CM

## 2024-04-12 DIAGNOSIS — Z12.39 ENCOUNTER FOR SCREENING BREAST EXAMINATION: ICD-10-CM

## 2024-04-12 DIAGNOSIS — Z78.0 POSTMENOPAUSAL: ICD-10-CM

## 2024-04-12 PROCEDURE — 99396 PREV VISIT EST AGE 40-64: CPT | Performed by: PHYSICIAN ASSISTANT

## 2024-04-12 NOTE — PROGRESS NOTES
"Assessment/Plan:    No problem-specific Assessment & Plan notes found for this encounter.       Diagnoses and all orders for this visit:    Encounter for well woman exam    Encounter for screening breast examination    Postmenopausal          Subjective:      Patient ID: Magaly Hendricks is a 58 y.o. female.    Pt presents for her annual exam today--  She has no complaints  She has no bleeding or pelvic pain--postmeno  Bowel and bladder are regular  Colonoscopy--  No breast concerns today  Last mammo--3/23    No pap today.    Rx mammo  Daily ca, d        The following portions of the patient's history were reviewed and updated as appropriate: allergies, current medications, past family history, past medical history, past social history, past surgical history, and problem list.    Review of Systems   Constitutional:  Negative for chills, fever and unexpected weight change.   HENT:  Negative for ear pain and sore throat.    Eyes:  Negative for pain and visual disturbance.   Respiratory:  Negative for cough and shortness of breath.    Cardiovascular:  Negative for chest pain and palpitations.   Gastrointestinal:  Negative for abdominal pain, blood in stool, constipation, diarrhea and vomiting.   Genitourinary: Negative.  Negative for dysuria and hematuria.   Musculoskeletal:  Negative for arthralgias and back pain.   Skin:  Negative for color change and rash.   Neurological:  Negative for seizures and syncope.   All other systems reviewed and are negative.        Objective:      /84   Ht 5' 2\" (1.575 m)   Wt 68.7 kg (151 lb 6.4 oz)   LMP 01/01/2017 (Within Months)   BMI 27.69 kg/m²          Physical Exam  Vitals and nursing note reviewed.   Constitutional:       Appearance: Normal appearance. She is well-developed.   HENT:      Head: Normocephalic and atraumatic.   Chest:   Breasts:     Right: No inverted nipple, mass, nipple discharge or skin change.      Left: No inverted nipple, mass, nipple discharge or " skin change.   Abdominal:      Palpations: Abdomen is soft.   Genitourinary:     General: Normal vulva.      Exam position: Supine.      Labia:         Right: No rash, tenderness or lesion.         Left: No rash, tenderness or lesion.       Vagina: Normal.      Cervix: No cervical motion tenderness, discharge or friability.      Adnexa:         Right: No mass, tenderness or fullness.          Left: No mass, tenderness or fullness.     Musculoskeletal:      Cervical back: Normal range of motion.   Lymphadenopathy:      Lower Body: No right inguinal adenopathy. No left inguinal adenopathy.   Neurological:      Mental Status: She is alert.

## 2024-04-13 ENCOUNTER — OFFICE VISIT (OUTPATIENT)
Dept: URGENT CARE | Age: 59
End: 2024-04-13
Payer: COMMERCIAL

## 2024-04-13 VITALS
SYSTOLIC BLOOD PRESSURE: 140 MMHG | HEART RATE: 108 BPM | WEIGHT: 151 LBS | BODY MASS INDEX: 27.62 KG/M2 | RESPIRATION RATE: 18 BRPM | OXYGEN SATURATION: 95 % | TEMPERATURE: 99.6 F | DIASTOLIC BLOOD PRESSURE: 94 MMHG

## 2024-04-13 DIAGNOSIS — J32.9 SINOBRONCHITIS: Primary | ICD-10-CM

## 2024-04-13 DIAGNOSIS — J02.9 SORE THROAT: ICD-10-CM

## 2024-04-13 DIAGNOSIS — J40 SINOBRONCHITIS: Primary | ICD-10-CM

## 2024-04-13 LAB — S PYO AG THROAT QL: NEGATIVE

## 2024-04-13 PROCEDURE — 87880 STREP A ASSAY W/OPTIC: CPT

## 2024-04-13 PROCEDURE — 99213 OFFICE O/P EST LOW 20 MIN: CPT

## 2024-04-13 RX ORDER — AMOXICILLIN AND CLAVULANATE POTASSIUM 875; 125 MG/1; MG/1
1 TABLET, FILM COATED ORAL EVERY 12 HOURS SCHEDULED
Qty: 10 TABLET | Refills: 0 | Status: SHIPPED | OUTPATIENT
Start: 2024-04-13 | End: 2024-04-18

## 2024-04-13 NOTE — PATIENT INSTRUCTIONS
Please take Augmentin 2x daily for 5 days.   Please trial warm salt water gargles, Chloraseptic spray, Cepacol cough drops and warm tea with honey as needed for sore throat.   Recommend using OTC medication such as Mucinex DM.  1.  Drink plenty fluids.    2.  Take probiotics [i.e. Yogurt, Acidophilus, Florastor (liquid)] daily.    3.  Over-the-counter medications as needed for symptomatic care.    4.   Advance activities as tolerated.    5.   Follow-up with your primary care physician in 3-4 days.    6.  Go to emergency room if symptoms are worsening.    7.  Use a humidifier at bedtime

## 2024-04-13 NOTE — PROGRESS NOTES
[facility]        NAME: Magaly Hendricks is a 58 y.o. female  : 1965    MRN: [id number]  DATE: 2024  TIME: 6:31 PM    Assessment and Plan   Sinobronchitis [J32.9, J40]  1. Sinobronchitis  amoxicillin-clavulanate (AUGMENTIN) 875-125 mg per tablet      2. Sore throat  POCT rapid ANTIGEN strepA            Patient Instructions     Please take Augmentin 2x daily for 5 days.   Please trial warm salt water gargles, Chloraseptic spray, Cepacol cough drops and warm tea with honey as needed for sore throat.   Recommend using OTC medication such as Mucinex DM.    Follow up with PCP in 3-5 days.  Proceed to  ER if symptoms worsen.    If tests are performed, our office will contact you with results only if changes need to made to the care plan discussed with you at the visit. [facility]    Chief Complaint     Chief Complaint   Patient presents with    Sore Throat    Cough    Cold Like Symptoms    Fatigue    Generalized Body Aches    Headache     Symptoms started on Monday with a chest cold and sore throat. Friday the symptoms have got worse.          History of Present Illness       58-year-old female presenting for evaluation of upper respiratory symptoms.  Patient states over the past week she has been experiencing a productive cough, congestion, chest tightness, sore throat, chills and fatigue.  She has been taking Tylenol with minimal relief of her symptoms.  She denies any measured fevers, chest pain, shortness of breath, nausea, vomiting or diarrhea.    Sore Throat   Associated symptoms include congestion, coughing and headaches. Pertinent negatives include no diarrhea, shortness of breath or vomiting.   Cough  Associated symptoms include chills, headaches and a sore throat. Pertinent negatives include no chest pain, fever or shortness of breath.   Fatigue  Associated symptoms include chills, congestion, coughing, fatigue, headaches and a sore throat.  Pertinent negatives include no chest pain, fever, nausea or vomiting.   Generalized Body Aches  Associated symptoms include congestion, headaches, a sore throat, fatigue and coughing. Pertinent negatives include no fever, chest pain, shortness of breath, diarrhea, nausea or vomiting.   Headache      Review of Systems   Review of Systems   Constitutional:  Positive for chills and fatigue. Negative for fever.   HENT:  Positive for congestion and sore throat.    Respiratory:  Positive for cough and chest tightness. Negative for shortness of breath.    Cardiovascular:  Negative for chest pain.   Gastrointestinal:  Negative for diarrhea, nausea and vomiting.   Neurological:  Positive for headaches.   All other systems reviewed and are negative.        Current Medications       Current Outpatient Medications:     amoxicillin-clavulanate (AUGMENTIN) 875-125 mg per tablet, Take 1 tablet by mouth every 12 (twelve) hours for 5 days, Disp: 10 tablet, Rfl: 0    COLLAGEN PO, Take by mouth (Patient not taking: Reported on 2024), Disp: , Rfl:     Current Allergies     Allergies as of 2024    (No Known Allergies)            The following portions of the patient's history were reviewed and updated as appropriate: allergies, current medications, past family history, past medical history, past social history, past surgical history and problem list.     Past Medical History:   Diagnosis Date    Asthma     exercise induced    Childhood asthma     Contact lens/glasses fitting     Dental root implant present     upper right and left    Dysphagia     food gets stuck-liquids are ok    Hyperlipidemia        Past Surgical History:   Procedure Laterality Date     SECTION  1992    DILATION AND CURETTAGE OF UTERUS  2010    TOOTH EXTRACTION      ,    UPPER GASTROINTESTINAL ENDOSCOPY  2020       Family History   Problem Relation Age of Onset    Heart disease Mother 55        CABG    Asthma Mother     Hyperlipidemia  Mother     Heart disease Father 75        CABG    Dementia Father     No Known Problems Sister     Colon cancer Paternal Grandmother 86    Skin cancer Maternal Aunt     Skin cancer Maternal Aunt     Skin cancer Maternal Aunt     Stroke Paternal Aunt     No Known Problems Paternal Aunt     Skin cancer Maternal Grandmother     No Known Problems Maternal Grandfather     No Known Problems Paternal Grandfather     No Known Problems Son     No Known Problems Son     Alcohol abuse Neg Hx     Substance Abuse Neg Hx     Mental illness Neg Hx     Depression Neg Hx          Medications have been verified.        Objective   /94   Pulse (!) 108   Temp 99.6 °F (37.6 °C)   Resp 18   Wt 68.5 kg (151 lb)   LMP 01/01/2017 (Within Months)   SpO2 95%   BMI 27.62 kg/m²        Physical Exam     Physical Exam  Vitals and nursing note reviewed.   Constitutional:       General: She is not in acute distress.     Appearance: Normal appearance. She is normal weight. She is not ill-appearing, toxic-appearing or diaphoretic.   HENT:      Head: Normocephalic and atraumatic.      Nose: No congestion or rhinorrhea.      Right Sinus: Frontal sinus tenderness present.      Left Sinus: Frontal sinus tenderness present.      Mouth/Throat:      Mouth: Mucous membranes are moist.      Pharynx: Oropharynx is clear. Posterior oropharyngeal erythema present. No oropharyngeal exudate.      Tonsils: 2+ on the right. 2+ on the left.   Eyes:      General:         Right eye: No discharge.         Left eye: No discharge.   Cardiovascular:      Rate and Rhythm: Normal rate and regular rhythm.      Pulses: Normal pulses.      Heart sounds: Normal heart sounds. No murmur heard.     No friction rub. No gallop.   Pulmonary:      Effort: Pulmonary effort is normal. No respiratory distress.      Breath sounds: Normal breath sounds. No stridor. No wheezing, rhonchi or rales.   Chest:      Chest wall: No tenderness.   Abdominal:      General: Bowel sounds  are normal.      Palpations: Abdomen is soft.      Tenderness: There is no abdominal tenderness.   Skin:     General: Skin is warm and dry.   Neurological:      Mental Status: She is alert.   Psychiatric:         Mood and Affect: Mood normal.         Behavior: Behavior normal.

## 2024-09-22 ENCOUNTER — HOSPITAL ENCOUNTER (OUTPATIENT)
Dept: MAMMOGRAPHY | Facility: HOSPITAL | Age: 59
Discharge: HOME | End: 2024-09-22
Payer: COMMERCIAL

## 2024-09-22 VITALS — BODY MASS INDEX: 27.79 KG/M2 | WEIGHT: 151 LBS | HEIGHT: 62 IN

## 2024-09-22 DIAGNOSIS — Z12.31 SCREENING MAMMOGRAM FOR BREAST CANCER: ICD-10-CM

## 2024-09-22 PROCEDURE — 77063 BREAST TOMOSYNTHESIS BI: CPT

## 2024-09-22 PROCEDURE — 77067 SCR MAMMO BI INCL CAD: CPT

## 2025-02-25 ENCOUNTER — TELEPHONE (OUTPATIENT)
Dept: INTERNAL MEDICINE CLINIC | Facility: CLINIC | Age: 60
End: 2025-02-25

## 2025-02-25 DIAGNOSIS — Z00.00 LABORATORY EXAMINATION ORDERED AS PART OF A ROUTINE GENERAL MEDICAL EXAMINATION: ICD-10-CM

## 2025-02-25 DIAGNOSIS — E78.49 OTHER HYPERLIPIDEMIA: Primary | ICD-10-CM

## 2025-02-25 NOTE — TELEPHONE ENCOUNTER
I called pt. to schedule a physical exam. Sched'd for July 22. Please put in labs for pt. to do. I adv'd her that she doesn't need the scripts, can just walk in to lab.

## 2025-04-17 ENCOUNTER — ANNUAL EXAM (OUTPATIENT)
Dept: OBGYN CLINIC | Facility: CLINIC | Age: 60
End: 2025-04-17
Payer: COMMERCIAL

## 2025-04-17 VITALS — DIASTOLIC BLOOD PRESSURE: 88 MMHG | SYSTOLIC BLOOD PRESSURE: 130 MMHG

## 2025-04-17 DIAGNOSIS — Z11.51 SCREENING FOR HPV (HUMAN PAPILLOMAVIRUS): ICD-10-CM

## 2025-04-17 DIAGNOSIS — Z01.419 WELL WOMAN EXAM WITH ROUTINE GYNECOLOGICAL EXAM: ICD-10-CM

## 2025-04-17 DIAGNOSIS — Z12.4 ENCOUNTER FOR SCREENING FOR MALIGNANT NEOPLASM OF CERVIX: ICD-10-CM

## 2025-04-17 DIAGNOSIS — Z12.31 ENCOUNTER FOR SCREENING MAMMOGRAM FOR MALIGNANT NEOPLASM OF BREAST: ICD-10-CM

## 2025-04-17 DIAGNOSIS — Z12.39 ENCOUNTER FOR SCREENING BREAST EXAMINATION: ICD-10-CM

## 2025-04-17 DIAGNOSIS — Z12.4 CERVICAL CANCER SCREENING: ICD-10-CM

## 2025-04-17 DIAGNOSIS — Z01.419 ENCOUNTER FOR WELL WOMAN EXAM: Primary | ICD-10-CM

## 2025-04-17 DIAGNOSIS — Z78.0 POSTMENOPAUSAL: ICD-10-CM

## 2025-04-17 PROCEDURE — G0145 SCR C/V CYTO,THINLAYER,RESCR: HCPCS | Performed by: PHYSICIAN ASSISTANT

## 2025-04-17 PROCEDURE — 99396 PREV VISIT EST AGE 40-64: CPT | Performed by: PHYSICIAN ASSISTANT

## 2025-04-17 PROCEDURE — G0476 HPV COMBO ASSAY CA SCREEN: HCPCS | Performed by: PHYSICIAN ASSISTANT

## 2025-04-17 NOTE — PROGRESS NOTES
:  Assessment & Plan  Well woman exam with routine gynecological exam    Orders:    Liquid-based pap, screening    Encounter for well woman exam         Encounter for screening breast examination         Cervical cancer screening         Screening for HPV (human papillomavirus)    Orders:    Liquid-based pap, screening    Encounter for screening for malignant neoplasm of cervix    Orders:    Liquid-based pap, screening    Encounter for screening mammogram for malignant neoplasm of breast    Orders:    Mammo screening bilateral w 3d and cad; Future    Postmenopausal             History of Present Illness     Magaly Hendricks is a 59 y.o. female   Pt presents for her annual exam today--  She has no gyn complaints  She has no bleeding or pelvic pain  Bowel and bladder are regular  Colonoscopy--  No breast concerns today  Last mammo--9/2024    pap today.    Rx mammo  Daily ca, d    Son getting  JUNE 2025!  :-)      Review of Systems   Constitutional:  Negative for chills, fever and unexpected weight change.   HENT:  Negative for ear pain and sore throat.    Eyes:  Negative for pain and visual disturbance.   Respiratory:  Negative for cough and shortness of breath.    Cardiovascular:  Negative for chest pain and palpitations.   Gastrointestinal:  Negative for abdominal pain, blood in stool, constipation, diarrhea and vomiting.   Genitourinary: Negative.  Negative for dysuria and hematuria.   Musculoskeletal:  Negative for arthralgias and back pain.   Skin:  Negative for color change and rash.   Neurological:  Negative for seizures and syncope.   All other systems reviewed and are negative.    Objective   /88 (BP Location: Right arm, Patient Position: Sitting, Cuff Size: Standard)   LMP 01/01/2017 (Within Months)      Physical Exam  Vitals and nursing note reviewed.   Constitutional:       General: She is not in acute distress.     Appearance: Normal appearance. She is well-developed.   HENT:      Head:  Normocephalic and atraumatic.   Eyes:      Conjunctiva/sclera: Conjunctivae normal.   Cardiovascular:      Rate and Rhythm: Normal rate and regular rhythm.      Heart sounds: No murmur heard.  Pulmonary:      Effort: Pulmonary effort is normal. No respiratory distress.      Breath sounds: Normal breath sounds.   Chest:   Breasts:     Right: Normal.      Left: Normal.   Abdominal:      Palpations: Abdomen is soft.      Tenderness: There is no abdominal tenderness.   Genitourinary:     General: Normal vulva.      Vagina: Normal.      Cervix: Normal.      Uterus: Normal.       Adnexa: Right adnexa normal and left adnexa normal.      Rectum: Normal.   Musculoskeletal:         General: No swelling.      Cervical back: Neck supple.   Skin:     General: Skin is warm and dry.      Capillary Refill: Capillary refill takes less than 2 seconds.   Neurological:      Mental Status: She is alert.   Psychiatric:         Mood and Affect: Mood normal.

## 2025-04-22 LAB
LAB AP GYN PRIMARY INTERPRETATION: NORMAL
Lab: NORMAL

## 2025-04-23 ENCOUNTER — RESULTS FOLLOW-UP (OUTPATIENT)
Dept: OBGYN CLINIC | Facility: CLINIC | Age: 60
End: 2025-04-23

## 2025-07-22 ENCOUNTER — OFFICE VISIT (OUTPATIENT)
Dept: INTERNAL MEDICINE CLINIC | Facility: CLINIC | Age: 60
End: 2025-07-22
Payer: COMMERCIAL

## 2025-07-22 VITALS
OXYGEN SATURATION: 98 % | DIASTOLIC BLOOD PRESSURE: 86 MMHG | RESPIRATION RATE: 14 BRPM | SYSTOLIC BLOOD PRESSURE: 140 MMHG | TEMPERATURE: 97.7 F

## 2025-07-22 DIAGNOSIS — E66.3 OVERWEIGHT (BMI 25.0-29.9): ICD-10-CM

## 2025-07-22 DIAGNOSIS — M79.645 THUMB PAIN, LEFT: ICD-10-CM

## 2025-07-22 DIAGNOSIS — Z00.00 ANNUAL PHYSICAL EXAM: Primary | ICD-10-CM

## 2025-07-22 DIAGNOSIS — E78.49 OTHER HYPERLIPIDEMIA: ICD-10-CM

## 2025-07-22 DIAGNOSIS — K22.2 SCHATZKI'S RING: ICD-10-CM

## 2025-07-22 PROCEDURE — 99396 PREV VISIT EST AGE 40-64: CPT | Performed by: INTERNAL MEDICINE

## 2025-07-22 NOTE — PROGRESS NOTES
Name: Magaly Hendricks      : 1965      MRN: [id number]  Encounter Provider: Breanne Lujan MD  Encounter Date: 2025   Encounter department: [facility] INTERNAL MEDICINE  :  Assessment & Plan  Annual physical exam  Due for colon cancer screening.       Other hyperlipidemia  Repeat lipids due. Not on medication.       Overweight (BMI 25.0-29.9)  Stable weight.  Recommend strengthening, core and toning exercises.       Thumb pain, left  Differential Dx: trigger thumb vs tendonitis vs OA.  Orders:  •  Ambulatory referral to Orthopedic Surgery; Future    Schatzki's ring  No issues.       Follow up in 1-2 years or as needed.       History of Present Illness   She complains of left thumb pain since about 3 or 4 months ago.  She is right handed. She reports it was initially popping but now having some pain. She pulls out her thumb some times with relief. No known injury. Denies any swelling.    She otherwise feels well, has no other complaints. No issues with sleep.  She walks the dog daily. She does Pilates once a week.      Review of Systems   Constitutional:  Negative for appetite change and fatigue.   HENT:  Negative for congestion, ear pain and postnasal drip.    Eyes:  Negative for visual disturbance.   Respiratory:  Negative for cough and shortness of breath.    Cardiovascular:  Negative for chest pain and leg swelling.   Gastrointestinal:  Negative for abdominal pain, constipation and diarrhea.   Genitourinary:  Negative for dysuria, frequency and urgency.   Musculoskeletal:  Negative for arthralgias and myalgias.   Skin:  Negative for rash and wound.   Neurological:  Negative for dizziness, numbness and headaches.   Hematological:  Does not bruise/bleed easily.   Psychiatric/Behavioral:  Negative for confusion. The patient is not nervous/anxious.        Objective   /86 (BP Location: Left arm, Patient Position: Sitting, Cuff Size: Standard)   Temp 97.7 °F (36.5 °C)   Resp 14    LMP 01/01/2017 (Within Months)   SpO2 98%      Physical Exam  Vitals and nursing note reviewed.   Constitutional:       General: She is not in acute distress.     Appearance: She is well-developed.   HENT:      Head: Normocephalic and atraumatic.      Right Ear: External ear normal.      Left Ear: External ear normal.      Mouth/Throat:      Mouth: Mucous membranes are moist.     Eyes:      Pupils: Pupils are equal, round, and reactive to light.       Cardiovascular:      Rate and Rhythm: Normal rate and regular rhythm.      Heart sounds: Normal heart sounds.   Pulmonary:      Effort: Pulmonary effort is normal.      Breath sounds: Normal breath sounds. No wheezing.   Abdominal:      General: Bowel sounds are normal.      Palpations: Abdomen is soft.     Musculoskeletal:         General: No swelling.      Left hand: No tenderness or bony tenderness. Normal range of motion.      Right lower leg: No edema.      Left lower leg: No edema.     Skin:     General: Skin is warm.      Findings: No rash.     Neurological:      General: No focal deficit present.      Mental Status: She is alert and oriented to person, place, and time.     Psychiatric:         Mood and Affect: Mood and affect normal. Mood is not anxious or depressed.         Behavior: Behavior normal.           Labs & imaging results reviewed with patient.

## 2025-07-31 ENCOUNTER — TELEPHONE (OUTPATIENT)
Dept: INTERNAL MEDICINE CLINIC | Facility: CLINIC | Age: 60
End: 2025-07-31

## 2025-08-20 ENCOUNTER — OFFICE VISIT (OUTPATIENT)
Dept: OBGYN CLINIC | Facility: CLINIC | Age: 60
End: 2025-08-20
Attending: INTERNAL MEDICINE
Payer: COMMERCIAL

## 2025-08-20 VITALS — HEIGHT: 62 IN | BODY MASS INDEX: 27.62 KG/M2

## 2025-08-20 DIAGNOSIS — M79.645 THUMB PAIN, LEFT: ICD-10-CM

## 2025-08-20 DIAGNOSIS — M65.312 TRIGGER THUMB OF LEFT HAND: Primary | ICD-10-CM

## 2025-08-20 PROCEDURE — 20550 NJX 1 TENDON SHEATH/LIGAMENT: CPT | Performed by: SURGERY

## 2025-08-20 PROCEDURE — 99203 OFFICE O/P NEW LOW 30 MIN: CPT | Performed by: SURGERY

## 2025-08-20 RX ORDER — DEXAMETHASONE SODIUM PHOSPHATE 10 MG/ML
40 INJECTION, SOLUTION INTRAMUSCULAR; INTRAVENOUS
Status: COMPLETED | OUTPATIENT
Start: 2025-08-20 | End: 2025-08-20

## 2025-08-20 RX ORDER — LIDOCAINE HYDROCHLORIDE 10 MG/ML
1 INJECTION, SOLUTION INFILTRATION; PERINEURAL
Status: COMPLETED | OUTPATIENT
Start: 2025-08-20 | End: 2025-08-20

## 2025-08-20 RX ADMIN — LIDOCAINE HYDROCHLORIDE 1 ML: 10 INJECTION, SOLUTION INFILTRATION; PERINEURAL at 15:30

## 2025-08-20 RX ADMIN — DEXAMETHASONE SODIUM PHOSPHATE 40 MG: 10 INJECTION, SOLUTION INTRAMUSCULAR; INTRAVENOUS at 15:30
